# Patient Record
Sex: MALE | Race: BLACK OR AFRICAN AMERICAN | NOT HISPANIC OR LATINO | Employment: PART TIME | URBAN - METROPOLITAN AREA
[De-identification: names, ages, dates, MRNs, and addresses within clinical notes are randomized per-mention and may not be internally consistent; named-entity substitution may affect disease eponyms.]

---

## 2022-02-20 ENCOUNTER — APPOINTMENT (OUTPATIENT)
Dept: GENERAL RADIOLOGY | Facility: CLINIC | Age: 22
End: 2022-02-20
Attending: EMERGENCY MEDICINE
Payer: COMMERCIAL

## 2022-02-20 ENCOUNTER — HOSPITAL ENCOUNTER (EMERGENCY)
Facility: CLINIC | Age: 22
Discharge: HOME | End: 2022-02-20
Attending: EMERGENCY MEDICINE | Admitting: EMERGENCY MEDICINE
Payer: COMMERCIAL

## 2022-02-20 VITALS
WEIGHT: 178 LBS | TEMPERATURE: 97.7 F | HEART RATE: 84 BPM | RESPIRATION RATE: 20 BRPM | BODY MASS INDEX: 22.84 KG/M2 | SYSTOLIC BLOOD PRESSURE: 145 MMHG | DIASTOLIC BLOOD PRESSURE: 85 MMHG | HEIGHT: 74 IN | OXYGEN SATURATION: 99 %

## 2022-02-20 DIAGNOSIS — F41.1 ANXIETY REACTION: ICD-10-CM

## 2022-02-20 LAB
ATRIAL RATE - MUSE: 101 BPM
DIASTOLIC BLOOD PRESSURE - MUSE: NORMAL MMHG
INTERPRETATION ECG - MUSE: NORMAL
P AXIS - MUSE: 74 DEGREES
PR INTERVAL - MUSE: 162 MS
QRS DURATION - MUSE: 104 MS
QT - MUSE: 340 MS
QTC - MUSE: 440 MS
R AXIS - MUSE: 59 DEGREES
SYSTOLIC BLOOD PRESSURE - MUSE: NORMAL MMHG
T AXIS - MUSE: 52 DEGREES
VENTRICULAR RATE- MUSE: 101 BPM

## 2022-02-20 PROCEDURE — 250N000013 HC RX MED GY IP 250 OP 250 PS 637: Performed by: EMERGENCY MEDICINE

## 2022-02-20 PROCEDURE — 71046 X-RAY EXAM CHEST 2 VIEWS: CPT

## 2022-02-20 PROCEDURE — 99284 EMERGENCY DEPT VISIT MOD MDM: CPT | Mod: 25

## 2022-02-20 PROCEDURE — 93005 ELECTROCARDIOGRAM TRACING: CPT

## 2022-02-20 RX ORDER — HYDROXYZINE HYDROCHLORIDE 25 MG/1
25 TABLET, FILM COATED ORAL ONCE
Status: COMPLETED | OUTPATIENT
Start: 2022-02-20 | End: 2022-02-20

## 2022-02-20 RX ADMIN — HYDROXYZINE HYDROCHLORIDE 25 MG: 25 TABLET, FILM COATED ORAL at 20:54

## 2022-02-20 ASSESSMENT — ENCOUNTER SYMPTOMS
NERVOUS/ANXIOUS: 1
FEVER: 0
ABDOMINAL PAIN: 0

## 2022-02-21 NOTE — ED TRIAGE NOTES
Patient reports left chest pain that started 1940. Patient reports that he was vaping Nicotine when pain started. Patient reports smoking THC. Patient also reports eating pizza 30 min prior to pain starting. Patient reports similar episode in July. Patient reports being told he was dx with anxiety.

## 2022-02-21 NOTE — ED PROVIDER NOTES
"  History   Chief Complaint:  Anxiety reaction    HPI   Kel Mckinley is a 21 year old male with history of anxiety and asthma who presents with chest pain and anxiety. The patient states about 45 minutes ago he was smoking marijuana when he began experiencing left-sided chest pain and a sense of impending doom. He feels this is very similar to previous panic attacks but he hasn't had one this long in 7 months. He also hasn't smoked in 7 months. He denies exacerbated pain with deep breaths. He denies recent fever or illness. Denies abdominal pain. He is vaccinated against Covid-19.    Review of Systems   Constitutional: Negative for fever.   Cardiovascular: Positive for chest pain.   Gastrointestinal: Negative for abdominal pain.   Psychiatric/Behavioral: The patient is nervous/anxious.    All other systems reviewed and are negative.      Allergies:  No Known Drug Allergies    Medications:  Zoloft    Past Medical History:     Asthma  Anxiety    Family History:    He denies family history of cardiac problems    Social History:  Presents alone  Smokes THC, vapes    Physical Exam     Patient Vitals for the past 24 hrs:   BP Temp Temp src Pulse Resp SpO2 Height Weight   02/20/22 2130 (!) 145/85 -- -- 84 20 -- -- --   02/20/22 2100 (!) 149/88 -- -- 100 15 -- -- --   02/20/22 2030 -- -- -- 86 16 99 % -- --   02/20/22 2023 (!) 172/79 97.7  F (36.5  C) Oral 97 20 100 % 1.88 m (6' 2\") 80.7 kg (178 lb)       Physical Exam  General: Alert and cooperative with exam. Patient in mild distress. Appears anxious.  Head:  Scalp is NC/AT  Eyes:  No scleral icterus, PERRL  ENT:  The external nose and ears are normal.   Neck:  Normal range of motion without rigidity.  CV:  Mildly tachycardic and normal rhythm    No pathologic murmur   Resp:  Breath sounds are clear bilaterally    Non-labored, no retractions or accessory muscle use  GI:  Abdomen is soft, no distension, no tenderness. No peritoneal signs  MS:  No lower " extremity edema   Skin:  Warm and dry, No rash or lesions noted.  Neuro: Oriented x 3. No gross motor deficits.    Emergency Department Course   ECG  ECG obtained at 2021, ECG read at 2030  Sinus tachycardia. Right atrial enlargement. Minimal voltage criteria for LVH, may be normal variant. Nonspecific T wave abnormality.    No prior EKG for comparison.  Rate 101 bpm. GA interval 162 ms. QRS duration 104 ms. QT/QTc 340/440 ms. P-R-T axes 74 59 52.     Imaging:  Chest XR,  PA & LAT   Final Result   IMPRESSION: Negative chest. Etiology for patient's chest pain is not definitely identified. No evidence for congestive heart failure, pneumothorax, pleural fluid collection, infiltrate, or acute osseous fracture is identified.        Report per radiology    Emergency Department Course:    Reviewed:  I reviewed nursing notes, vitals and past medical history    Assessments:  2030 I obtained history and examined the patient as noted above.   2133 I rechecked the patient and explained findings.     Interventions:  2054: Atarax 25 mg PO    Disposition:  The patient was discharged to home.     Impression & Plan     CMS Diagnoses: None    Medical Decision Making:  Kel Mckinley is a 21 year old male who presents for evaluation of anxiety and chest discomfort after smoking marijuana; presentation similar to previous anxiety reactions. There is history of anxiety.  He feels improved after interventions as noted above in the Emergency Department.  There is no signs at this point of a general medical problem causing anxiety (PE, hyperthyroidism, other metabolic derangement, infection, etc).  There are no signs of serious decompensation warranting psychiatric hospitalization or 72 hold (no suicidal or homicidal ideation, no danger to self).  Supportive outpatient management is therefore indicated.      Diagnosis:    ICD-10-CM    1. Anxiety reaction  F41.1        Scribe Disclosure:  Samara BENJAMIN, am serving  as a scribe at 8:26 PM on 2/20/2022 to document services personally performed by Miguel Muir DO based on my observations and the provider's statements to me.            Miguel Muir DO  02/20/22 [id number]

## 2022-03-04 LAB
ATRIAL RATE - MUSE: 112 BPM
DIASTOLIC BLOOD PRESSURE - MUSE: NORMAL MMHG
INTERPRETATION ECG - MUSE: NORMAL
P AXIS - MUSE: 80 DEGREES
PR INTERVAL - MUSE: 146 MS
QRS DURATION - MUSE: 100 MS
QT - MUSE: 328 MS
QTC - MUSE: 447 MS
R AXIS - MUSE: 57 DEGREES
SYSTOLIC BLOOD PRESSURE - MUSE: NORMAL MMHG
T AXIS - MUSE: 42 DEGREES
VENTRICULAR RATE- MUSE: 112 BPM

## 2022-03-04 PROCEDURE — 93005 ELECTROCARDIOGRAM TRACING: CPT

## 2022-03-04 PROCEDURE — 99284 EMERGENCY DEPT VISIT MOD MDM: CPT

## 2022-03-05 ENCOUNTER — HOSPITAL ENCOUNTER (EMERGENCY)
Facility: CLINIC | Age: 22
Discharge: HOME | End: 2022-03-05
Attending: EMERGENCY MEDICINE | Admitting: EMERGENCY MEDICINE
Payer: COMMERCIAL

## 2022-03-05 VITALS
SYSTOLIC BLOOD PRESSURE: 138 MMHG | HEIGHT: 75 IN | OXYGEN SATURATION: 99 % | HEART RATE: 85 BPM | RESPIRATION RATE: 15 BRPM | TEMPERATURE: 98 F | WEIGHT: 180 LBS | BODY MASS INDEX: 22.38 KG/M2 | DIASTOLIC BLOOD PRESSURE: 74 MMHG

## 2022-03-05 DIAGNOSIS — F41.9 ACUTE ANXIETY: ICD-10-CM

## 2022-03-05 DIAGNOSIS — R07.89 ATYPICAL CHEST PAIN: ICD-10-CM

## 2022-03-05 LAB
ANION GAP SERPL CALCULATED.3IONS-SCNC: 7 MMOL/L (ref 3–14)
BASOPHILS # BLD AUTO: 0.1 10E3/UL (ref 0–0.2)
BASOPHILS NFR BLD AUTO: 1 %
BUN SERPL-MCNC: 11 MG/DL (ref 7–30)
CALCIUM SERPL-MCNC: 8.4 MG/DL (ref 8.5–10.1)
CHLORIDE BLD-SCNC: 104 MMOL/L (ref 94–109)
CO2 SERPL-SCNC: 26 MMOL/L (ref 20–32)
CREAT SERPL-MCNC: 0.72 MG/DL (ref 0.66–1.25)
EOSINOPHIL # BLD AUTO: 0.2 10E3/UL (ref 0–0.7)
EOSINOPHIL NFR BLD AUTO: 2 %
ERYTHROCYTE [DISTWIDTH] IN BLOOD BY AUTOMATED COUNT: 13.2 % (ref 10–15)
GFR SERPL CREATININE-BSD FRML MDRD: >90 ML/MIN/1.73M2
GLUCOSE BLD-MCNC: 114 MG/DL (ref 70–99)
HCT VFR BLD AUTO: 41.6 % (ref 40–53)
HGB BLD-MCNC: 14 G/DL (ref 13.3–17.7)
IMM GRANULOCYTES # BLD: 0.1 10E3/UL
IMM GRANULOCYTES NFR BLD: 1 %
LYMPHOCYTES # BLD AUTO: 2 10E3/UL (ref 0.8–5.3)
LYMPHOCYTES NFR BLD AUTO: 17 %
MCH RBC QN AUTO: 29.5 PG (ref 26.5–33)
MCHC RBC AUTO-ENTMCNC: 33.7 G/DL (ref 31.5–36.5)
MCV RBC AUTO: 88 FL (ref 78–100)
MONOCYTES # BLD AUTO: 0.9 10E3/UL (ref 0–1.3)
MONOCYTES NFR BLD AUTO: 8 %
NEUTROPHILS # BLD AUTO: 8.1 10E3/UL (ref 1.6–8.3)
NEUTROPHILS NFR BLD AUTO: 71 %
NRBC # BLD AUTO: 0 10E3/UL
NRBC BLD AUTO-RTO: 0 /100
PLATELET # BLD AUTO: 227 10E3/UL (ref 150–450)
POTASSIUM BLD-SCNC: 3.4 MMOL/L (ref 3.4–5.3)
RBC # BLD AUTO: 4.75 10E6/UL (ref 4.4–5.9)
SODIUM SERPL-SCNC: 137 MMOL/L (ref 133–144)
TROPONIN I SERPL HS-MCNC: <3 NG/L
WBC # BLD AUTO: 11.3 10E3/UL (ref 4–11)

## 2022-03-05 PROCEDURE — 36415 COLL VENOUS BLD VENIPUNCTURE: CPT | Performed by: EMERGENCY MEDICINE

## 2022-03-05 PROCEDURE — 84484 ASSAY OF TROPONIN QUANT: CPT | Performed by: EMERGENCY MEDICINE

## 2022-03-05 PROCEDURE — 85025 COMPLETE CBC W/AUTO DIFF WBC: CPT | Performed by: EMERGENCY MEDICINE

## 2022-03-05 PROCEDURE — 250N000013 HC RX MED GY IP 250 OP 250 PS 637: Performed by: EMERGENCY MEDICINE

## 2022-03-05 PROCEDURE — 80048 BASIC METABOLIC PNL TOTAL CA: CPT | Performed by: EMERGENCY MEDICINE

## 2022-03-05 RX ORDER — HYDROXYZINE HYDROCHLORIDE 25 MG/1
25 TABLET, FILM COATED ORAL ONCE
Status: COMPLETED | OUTPATIENT
Start: 2022-03-05 | End: 2022-03-05

## 2022-03-05 RX ADMIN — HYDROXYZINE HYDROCHLORIDE 25 MG: 25 TABLET ORAL at 00:47

## 2022-03-05 ASSESSMENT — ENCOUNTER SYMPTOMS
NERVOUS/ANXIOUS: 1
PALPITATIONS: 1
LIGHT-HEADEDNESS: 1
TREMORS: 1
FEVER: 0
COUGH: 0
CHILLS: 0

## 2022-03-05 NOTE — ED PROVIDER NOTES
"  History     Chief Complaint:  Anxiety.     The history is provided by the patient.      Kel Mckinley is a 21 year old male with history of asthma who presents with anxiety. The patient reports that starting around 2230 this evening, he began to feel palpitations which he describes as feeling as though his heart is beating especially hard. He also notes some tremors, left-sided chest pain which he describes as pressure, and some associated light-headedness. He says these symptoms are constant, and feel similar to previous panic attacks following marijuana use, and notes that the onset of his symptoms tonight began after he used marijuana. Patient denies any syncope, cough, fever, chills, or family history of heart problems. Of note, the patient normally takes 50 mg Zoloft daily, however today he only took 25 mg as he ran out, and has not yet refilled his prescription.     Review of Systems   Constitutional: Negative for chills and fever.   Respiratory: Negative for cough.    Cardiovascular: Positive for chest pain and palpitations.   Neurological: Positive for tremors and light-headedness. Negative for syncope.   Psychiatric/Behavioral: The patient is nervous/anxious.    All other systems reviewed and are negative.    Allergies:  The patient has no known allergies.    Medications:    Zoloft    Past Medical History:    Asthma     Family History:    Father: hypertension  Mother: hypercholesterolemia, hypertension, thyroid disorder     Social History:  The patient presents to the ED alone.    Physical Exam     Patient Vitals for the past 24 hrs:   BP Temp Temp src Pulse Resp SpO2 Height Weight   03/05/22 0100 -- -- -- 85 15 99 % -- --   03/05/22 0030 138/74 -- -- 72 -- 98 % -- --   03/04/22 2333 (!) 145/82 98  F (36.7  C) Oral 81 24 100 % 1.905 m (6' 3\") 81.6 kg (180 lb)       Physical Exam  General: alert, lying comfortably on gurney  HENT: mucous membranes moist  CV: regular rate, regular " rhythm, 2+ radial pulses  Resp: normal effort, clear throughout, no crackles or wheezing  GI: abdomen soft and nontender, no guarding  MSK: no bony tenderness.  Left chest wall is slightly tender to palpation, reproduces symptoms.  Skin: appropriately warm and dry  Extremities: no edema, calves non-tender  Neuro: alert, clear speech, oriented  Psych: normal mood and affect.  No hallucinations.  No paranoid delusions.    Emergency Department Course   ECG:  ECG taken at 2329, ECG read at 0134  Sinus tachycardia. T wave abnormality, consider anterior ischemia. Abnormal ECG.    No significant change as compared to prior, dated 2/20/22.  Rate 112 bpm. NC interval 146 ms. QRS duration 100 ms. QT/QTc 328/447 ms. P-R-T axes 80 57 42.     Laboratory:  Labs Ordered and Resulted from Time of ED Arrival to Time of ED Departure   BASIC METABOLIC PANEL - Abnormal       Result Value    Sodium 137      Potassium 3.4      Chloride 104      Carbon Dioxide (CO2) 26      Anion Gap 7      Urea Nitrogen 11      Creatinine 0.72      Calcium 8.4 (*)     Glucose 114 (*)     GFR Estimate >90     CBC WITH PLATELETS AND DIFFERENTIAL - Abnormal    WBC Count 11.3 (*)     RBC Count 4.75      Hemoglobin 14.0      Hematocrit 41.6      MCV 88      MCH 29.5      MCHC 33.7      RDW 13.2      Platelet Count 227      % Neutrophils 71      % Lymphocytes 17      % Monocytes 8      % Eosinophils 2      % Basophils 1      % Immature Granulocytes 1      NRBCs per 100 WBC 0      Absolute Neutrophils 8.1      Absolute Lymphocytes 2.0      Absolute Monocytes 0.9      Absolute Eosinophils 0.2      Absolute Basophils 0.1      Absolute Immature Granulocytes 0.1      Absolute NRBCs 0.0     TROPONIN I - Normal    Troponin I High Sensitivity <3       Emergency Department Course:       Reviewed:  I reviewed nursing notes, vitals, past history and care everywhere    Assessments:  0001 I obtained history and examined the patient as noted above.   0128 I rechecked the  patient and explained findings. Prepared for discharge.         Interventions:  0047 Atarax 25 mg PO     Disposition:  The patient was discharged to home.    Impression & Plan         Medical Decision Making:  Kel Mckinley is a 21 year old male is a 21 year old male who presents with anxiety, chest discomfort, and palpitations.  The work up in the Emergency Department is negative.  The differential diagnosis of chest pain is broad and includes life threatening etiologies such as acute coronary syndrome, myocardial infarction, pulmonary embolism, acute aortic dissection,  or esophageal rupture.  Other causes may include pneumonia, pneumothorax, pericarditis, myocarditis, pleurisy, esophageal spasm.  No serious etiology for the chest pain were detected today during this visit.  The patient is PERC negative, and does not require additional testing to rule out pulmonary embolism.  He is very low risk for ACS, and given atypical symptoms, provocative testing is not indicated.  Symptoms again seem to be related to an anxiety reaction.  He notes an association with the use of marijuana, and has decided he will stop using marijuana in the future.  He is not a regular user, and does not seem to be dependent.  Close follow up with primary care is indicated should the pain continue, as further work up may be performed; this was made clear to the patient, who understands.    Critical Care time:  none    Diagnosis:    ICD-10-CM    1. Acute anxiety  F41.9 Primary Care Referral   2. Atypical chest pain  R07.89 Primary Care Referral       Discharge Medications:  None.    Scribe Disclosure:  Destin BENJAMIN, am serving as a scribe at 12:06 AM on 3/5/2022 to document services personally performed by Nasrin Hurtado MD based on my observations and the provider's statements to me.      Nasrin Hurtado MD  03/05/22 0689